# Patient Record
Sex: FEMALE | Race: WHITE
[De-identification: names, ages, dates, MRNs, and addresses within clinical notes are randomized per-mention and may not be internally consistent; named-entity substitution may affect disease eponyms.]

---

## 2017-12-01 ENCOUNTER — HOSPITAL ENCOUNTER (OUTPATIENT)
Dept: HOSPITAL 53 - M RAD | Age: 27
End: 2017-12-01
Attending: ADVANCED PRACTICE MIDWIFE
Payer: COMMERCIAL

## 2017-12-01 DIAGNOSIS — Z36.2: Primary | ICD-10-CM

## 2017-12-01 NOTE — REP
Clinical:  Anatomical evaluation.

 

Comparison: 11/08/2017 .

 

Findings:

Examination demonstrates a single live intrauterine pregnancy in breech

presentation.  Fetal motion is identified by technologist.  Placenta is noted

anteriorly and grade one without evidence for placenta previa or abruption.

Amniotic fluid volume is normal.  Cervix measures 4.0 cm in length and appears

closed.  No evidence for nuchal cord.

 

Gestational age by LMP 22 weeks 3 days with ZARINA 04/03/2018 .

Gestational age by current measurements 21 weeks 5 days with ZARINA 04/08/2018 .

 

FHR equals 162 beats per minute.

Estimated fetal weight 413 grams ( 13th percentile).

 

Anatomical assessment demonstrates normal structures including cranium, choroid

plexus, cavum, cerebellum/posterior fossa, facial features, lungs, four-chamber

heart/ventricular outflow tracts, diaphragm, stomach, cord insertion/three-vessel

cord, kidneys/bladder, spine, and extremities.

 

Impression:

Single live intrauterine pregnancy in breech presentation demonstrating

appropriate interval growth.  Anatomical assessment is complete and normal.  No

gross abnormalities are identified.

 

 

Signed by

Dilan Monge MD 12/01/2017 11:46 A

## 2018-01-08 ENCOUNTER — HOSPITAL ENCOUNTER (OUTPATIENT)
Dept: HOSPITAL 53 - M SMT | Age: 28
End: 2018-01-08
Attending: ADVANCED PRACTICE MIDWIFE
Payer: COMMERCIAL

## 2018-01-08 DIAGNOSIS — Z34.82: Primary | ICD-10-CM

## 2018-01-08 LAB
GLUCOSE CHALLENGE TEST 1 HOUR: 117 MG/DL (ref ?–140)
HEMATOCRIT: 36.9 % (ref 36–47)
HEMOGLOBIN: 12.5 G/DL (ref 12–16)
MEAN CORPUSCULAR HEMOGLOBIN: 30.6 PG (ref 27–33)
MEAN CORPUSCULAR HGB CONC: 33.9 G/DL (ref 32–36.5)
MEAN CORPUSCULAR VOLUME: 90.2 FL (ref 80–96)
NRBC BLD AUTO-RTO: 0 % (ref 0–0)
PLATELET COUNT, AUTOMATED: 215 10^3/UL (ref 150–450)
RED BLOOD COUNT: 4.09 10^6/UL (ref 4–5.4)
RED CELL DISTRIBUTION WIDTH: 13 % (ref 11.5–14.5)
WHITE BLOOD COUNT: 8.7 10^3/UL (ref 4–10)

## 2018-01-08 PROCEDURE — 82950 GLUCOSE TEST: CPT

## 2018-03-15 ENCOUNTER — HOSPITAL ENCOUNTER (INPATIENT)
Dept: HOSPITAL 53 - M LDO | Age: 28
LOS: 2 days | Discharge: HOME | DRG: 560 | End: 2018-03-17
Payer: COMMERCIAL

## 2018-03-15 DIAGNOSIS — Z3A.37: ICD-10-CM

## 2018-03-15 LAB
HEMATOCRIT: 39.7 % (ref 36–47)
HEMOGLOBIN: 13.7 G/DL (ref 12–16)
MEAN CORPUSCULAR HEMOGLOBIN: 30.1 PG (ref 27–33)
MEAN CORPUSCULAR HGB CONC: 34.5 G/DL (ref 32–36.5)
MEAN CORPUSCULAR VOLUME: 87.3 FL (ref 80–96)
NRBC BLD AUTO-RTO: 0 % (ref 0–0)
PLATELET COUNT, AUTOMATED: 248 10^3/UL (ref 150–450)
RED BLOOD COUNT: 4.55 10^6/UL (ref 4–5.4)
RED CELL DISTRIBUTION WIDTH: 13.3 % (ref 11.5–14.5)
WHITE BLOOD COUNT: 13.6 10^3/UL (ref 4–10)

## 2018-03-15 RX ADMIN — SODIUM CHLORIDE, POTASSIUM CHLORIDE, SODIUM LACTATE AND CALCIUM CHLORIDE 1 MLS/HR: 600; 310; 30; 20 INJECTION, SOLUTION INTRAVENOUS at 14:32

## 2018-03-15 RX ADMIN — Medication 1 MLS/HR: at 12:36

## 2018-03-15 RX ADMIN — DEXTROSE MONOHYDRATE 1 MLS/HR: 5 INJECTION INTRAVENOUS at 05:38

## 2018-03-15 RX ADMIN — Medication 1 MLS/HR: at 14:54

## 2018-03-15 RX ADMIN — Medication 1 MLS/HR: at 11:13

## 2018-03-15 RX ADMIN — SODIUM CHLORIDE, POTASSIUM CHLORIDE, SODIUM LACTATE AND CALCIUM CHLORIDE 1 MLS/HR: 600; 310; 30; 20 INJECTION, SOLUTION INTRAVENOUS at 12:35

## 2018-03-16 LAB — SYPHILIS: NONREACTIVE

## 2018-03-16 RX ADMIN — IBUPROFEN 1 MG: 800 TABLET, FILM COATED ORAL at 09:13

## 2018-03-16 RX ADMIN — Medication 1 TAB: at 09:08

## 2018-03-17 RX ADMIN — Medication 1 TAB: at 08:54

## 2018-03-17 RX ADMIN — IBUPROFEN 1 MG: 800 TABLET, FILM COATED ORAL at 08:55

## 2018-09-01 ENCOUNTER — HOSPITAL ENCOUNTER (OUTPATIENT)
Dept: HOSPITAL 53 - M WUC | Age: 28
End: 2018-09-01
Attending: PHYSICIAN ASSISTANT
Payer: COMMERCIAL

## 2018-09-01 DIAGNOSIS — M79.671: Primary | ICD-10-CM

## 2018-12-28 ENCOUNTER — HOSPITAL ENCOUNTER (OUTPATIENT)
Dept: HOSPITAL 53 - M LAB | Age: 28
End: 2018-12-28
Attending: ADVANCED PRACTICE MIDWIFE
Payer: COMMERCIAL

## 2018-12-28 DIAGNOSIS — Z36.89: ICD-10-CM

## 2018-12-28 DIAGNOSIS — Z34.81: Primary | ICD-10-CM

## 2018-12-28 LAB
BASOPHILS # BLD AUTO: 0 10^3/UL (ref 0–0.2)
BASOPHILS NFR BLD AUTO: 0.2 % (ref 0–1)
CHLAMYDIA DNA AMPLIFICATION: NEGATIVE
EOSINOPHIL # BLD AUTO: 0.1 10^3/UL (ref 0–0.5)
EOSINOPHIL NFR BLD AUTO: 1.6 % (ref 0–3)
HCT VFR BLD AUTO: 40.3 % (ref 36–47)
HGB BLD-MCNC: 14 G/DL (ref 12–15.5)
LYMPHOCYTES # BLD AUTO: 2.4 10^3/UL (ref 1.5–6.5)
LYMPHOCYTES NFR BLD AUTO: 28.3 % (ref 24–44)
MCH RBC QN AUTO: 31 PG (ref 27–33)
MCHC RBC AUTO-ENTMCNC: 34.7 G/DL (ref 32–36.5)
MCV RBC AUTO: 89.4 FL (ref 80–96)
MONOCYTES # BLD AUTO: 0.3 10^3/UL (ref 0–0.8)
MONOCYTES NFR BLD AUTO: 3.7 % (ref 0–5)
N GONORRHOEA RRNA SPEC QL NAA+PROBE: NEGATIVE
NEUTROPHILS # BLD AUTO: 5.7 10^3/UL (ref 1.8–7.7)
NEUTROPHILS NFR BLD AUTO: 65.9 % (ref 36–66)
PLATELET # BLD AUTO: 204 10^3/UL (ref 150–450)
RBC # BLD AUTO: 4.51 10^6/UL (ref 4–5.4)
WBC # BLD AUTO: 8.6 10^3/UL (ref 4–10)

## 2018-12-31 LAB
HCV AB SER QL: 0.1 INDEX (ref ?–0.8)
HIV 1+2 AB+HIV1 P24 AG SERPL QL IA: NEGATIVE
RUBELLA IGG QUALITATIVE: (no result)

## 2019-01-22 ENCOUNTER — HOSPITAL ENCOUNTER (OUTPATIENT)
Dept: HOSPITAL 53 - M RAD | Age: 29
End: 2019-01-22
Attending: ADVANCED PRACTICE MIDWIFE
Payer: COMMERCIAL

## 2019-01-22 DIAGNOSIS — Z36.89: ICD-10-CM

## 2019-01-22 DIAGNOSIS — Z3A.18: ICD-10-CM

## 2019-01-23 NOTE — REP
Clinical:  Anatomical evaluation.

 

Comparison: 12/18/2018 .

 

Findings:

Examination demonstrates a single live intrauterine pregnancy in breech

presentation.  Fetal motion is identified by technologist.  Placenta is noted

anterior/left lateral and grade 1 with evidence for vasa previa.  No placenta

previa or abruption.  Amniotic fluid volume is normal.  Cervix measures 5.2 cm in

length and appears closed.  No evidence for nuchal cord.

 

Gestational age by LMP 18 weeks 3 days with ZARINA 06/22/ 2019 .

Gestational age by current measurements 18 weeks 2 days with ZARINA 06/23/2019 .

 

FHR equals 130 beats per minute.

BPD  4.0 cm     18 weeks 1 day

HC  15.1 cm     18 weeks 1 day

AC  12.8 cm     18 weeks 3 days

FL  2.6 cm      17 weeks 6 days

HL  2.6 cm      18 weeks 2 days

HC/AC ratio  1.18

 

Estimated fetal weight 226 grams ( 33rd percentile).

 

Anatomical assessment demonstrates normal structures including cranium, choroid

plexus, cavum, cerebellum/posterior fossa, facial features, lungs, four-chamber

heart/ventricular outflow tracts, diaphragm, stomach, cord insertion/three-vessel

cord, kidneys/bladder, spine, and extremities.

 

Impression:

1.  Single live intrauterine pregnancy in breech presentation.  Appropriate

interval growth noted.

2.  Vasa previa noted.

3.  Limited evaluation of the facial profile.  Otherwise normal anatomical

assessment.

 

 

Electronically Signed by

Dilan Monge MD 01/23/2019 07:47 A

## 2019-03-28 ENCOUNTER — HOSPITAL ENCOUNTER (OUTPATIENT)
Dept: HOSPITAL 53 - M SMT | Age: 29
End: 2019-03-28
Attending: ADVANCED PRACTICE MIDWIFE
Payer: COMMERCIAL

## 2019-03-28 DIAGNOSIS — Z3A.00: ICD-10-CM

## 2019-03-28 DIAGNOSIS — O43.892: Primary | ICD-10-CM

## 2019-03-28 LAB
HCT VFR BLD AUTO: 37.8 % (ref 36–47)
HGB BLD-MCNC: 12.8 G/DL (ref 12–15.5)
MCH RBC QN AUTO: 30.2 PG (ref 27–33)
MCHC RBC AUTO-ENTMCNC: 33.9 G/DL (ref 32–36.5)
MCV RBC AUTO: 89.2 FL (ref 80–96)
PLATELET # BLD AUTO: 203 10^3/UL (ref 150–450)
RBC # BLD AUTO: 4.24 10^6/UL (ref 4–5.4)
WBC # BLD AUTO: 8.5 10^3/UL (ref 4–10)

## 2019-04-04 ENCOUNTER — HOSPITAL ENCOUNTER (OUTPATIENT)
Dept: HOSPITAL 53 - M LAB | Age: 29
End: 2019-04-04
Attending: OBSTETRICS & GYNECOLOGY
Payer: COMMERCIAL

## 2019-04-04 DIAGNOSIS — Z34.82: Primary | ICD-10-CM

## 2019-04-04 DIAGNOSIS — Z3A.00: ICD-10-CM

## 2019-06-06 ENCOUNTER — HOSPITAL ENCOUNTER (OUTPATIENT)
Dept: HOSPITAL 53 - M LAB | Age: 29
End: 2019-06-06
Attending: ADVANCED PRACTICE MIDWIFE
Payer: COMMERCIAL

## 2019-06-06 DIAGNOSIS — O13.3: Primary | ICD-10-CM

## 2019-06-06 DIAGNOSIS — Z3A.00: ICD-10-CM

## 2019-06-06 LAB
ALT SERPL W P-5'-P-CCNC: 50 U/L (ref 12–78)
BILIRUB SERPL-MCNC: 0.3 MG/DL (ref 0.2–1)
CREAT SERPL-MCNC: 0.67 MG/DL (ref 0.55–1.3)
CREAT UR-MCNC: 62.8 MG/DL
GFR SERPL CREATININE-BSD FRML MDRD: > 60 ML/MIN/{1.73_M2} (ref 60–?)
HCT VFR BLD AUTO: 40.2 % (ref 36–47)
HGB BLD-MCNC: 13.8 G/DL (ref 12–15.5)
LDH SERPL L TO P-CCNC: 166 U/L (ref 84–246)
MCH RBC QN AUTO: 30.3 PG (ref 27–33)
MCHC RBC AUTO-ENTMCNC: 34.3 G/DL (ref 32–36.5)
MCV RBC AUTO: 88.2 FL (ref 80–96)
PLATELET # BLD AUTO: 210 10^3/UL (ref 150–450)
PROT UR-MCNC: 8.8 MG/DL (ref 0–12)
RBC # BLD AUTO: 4.56 10^6/UL (ref 4–5.4)
URATE SERPL-MCNC: 4.6 MG/DL (ref 2.6–6)
WBC # BLD AUTO: 9.3 10^3/UL (ref 4–10)

## 2019-06-08 ENCOUNTER — HOSPITAL ENCOUNTER (INPATIENT)
Dept: HOSPITAL 53 - M LDI | Age: 29
LOS: 2 days | Discharge: HOME | DRG: 560 | End: 2019-06-10
Attending: ADVANCED PRACTICE MIDWIFE | Admitting: ADVANCED PRACTICE MIDWIFE
Payer: COMMERCIAL

## 2019-06-08 VITALS — DIASTOLIC BLOOD PRESSURE: 65 MMHG | SYSTOLIC BLOOD PRESSURE: 118 MMHG

## 2019-06-08 VITALS — DIASTOLIC BLOOD PRESSURE: 73 MMHG | SYSTOLIC BLOOD PRESSURE: 126 MMHG

## 2019-06-08 VITALS — HEIGHT: 68 IN | BODY MASS INDEX: 34.88 KG/M2 | WEIGHT: 230.16 LBS

## 2019-06-08 VITALS — DIASTOLIC BLOOD PRESSURE: 71 MMHG | SYSTOLIC BLOOD PRESSURE: 122 MMHG

## 2019-06-08 VITALS — SYSTOLIC BLOOD PRESSURE: 120 MMHG | DIASTOLIC BLOOD PRESSURE: 71 MMHG

## 2019-06-08 VITALS — DIASTOLIC BLOOD PRESSURE: 84 MMHG | SYSTOLIC BLOOD PRESSURE: 121 MMHG

## 2019-06-08 VITALS — DIASTOLIC BLOOD PRESSURE: 60 MMHG | SYSTOLIC BLOOD PRESSURE: 131 MMHG

## 2019-06-08 VITALS — DIASTOLIC BLOOD PRESSURE: 73 MMHG | SYSTOLIC BLOOD PRESSURE: 120 MMHG

## 2019-06-08 VITALS — DIASTOLIC BLOOD PRESSURE: 60 MMHG | SYSTOLIC BLOOD PRESSURE: 126 MMHG

## 2019-06-08 VITALS — SYSTOLIC BLOOD PRESSURE: 129 MMHG | DIASTOLIC BLOOD PRESSURE: 60 MMHG

## 2019-06-08 VITALS — DIASTOLIC BLOOD PRESSURE: 68 MMHG | SYSTOLIC BLOOD PRESSURE: 118 MMHG

## 2019-06-08 VITALS — DIASTOLIC BLOOD PRESSURE: 63 MMHG | SYSTOLIC BLOOD PRESSURE: 126 MMHG

## 2019-06-08 VITALS — DIASTOLIC BLOOD PRESSURE: 71 MMHG | SYSTOLIC BLOOD PRESSURE: 123 MMHG

## 2019-06-08 VITALS — SYSTOLIC BLOOD PRESSURE: 116 MMHG | DIASTOLIC BLOOD PRESSURE: 65 MMHG

## 2019-06-08 VITALS — DIASTOLIC BLOOD PRESSURE: 64 MMHG | SYSTOLIC BLOOD PRESSURE: 121 MMHG

## 2019-06-08 VITALS — DIASTOLIC BLOOD PRESSURE: 67 MMHG | SYSTOLIC BLOOD PRESSURE: 126 MMHG

## 2019-06-08 VITALS — DIASTOLIC BLOOD PRESSURE: 65 MMHG | SYSTOLIC BLOOD PRESSURE: 128 MMHG

## 2019-06-08 VITALS — SYSTOLIC BLOOD PRESSURE: 126 MMHG | DIASTOLIC BLOOD PRESSURE: 66 MMHG

## 2019-06-08 VITALS — SYSTOLIC BLOOD PRESSURE: 122 MMHG | DIASTOLIC BLOOD PRESSURE: 70 MMHG

## 2019-06-08 DIAGNOSIS — Z3A.39: ICD-10-CM

## 2019-06-08 LAB
ALT SERPL W P-5'-P-CCNC: 42 U/L (ref 12–78)
BILIRUB SERPL-MCNC: 0.2 MG/DL (ref 0.2–1)
CREAT SERPL-MCNC: 0.69 MG/DL (ref 0.55–1.3)
CREAT UR-MCNC: 115 MG/DL
GFR SERPL CREATININE-BSD FRML MDRD: > 60 ML/MIN/{1.73_M2} (ref 60–?)
HCT VFR BLD AUTO: 38.8 % (ref 36–47)
HGB BLD-MCNC: 13.4 G/DL (ref 12–15.5)
LDH SERPL L TO P-CCNC: 148 U/L (ref 84–246)
MCH RBC QN AUTO: 30.9 PG (ref 27–33)
MCHC RBC AUTO-ENTMCNC: 34.5 G/DL (ref 32–36.5)
MCV RBC AUTO: 89.6 FL (ref 80–96)
PLATELET # BLD AUTO: 175 10^3/UL (ref 150–450)
PROT UR-MCNC: 39.4 MG/DL (ref 0–12)
RBC # BLD AUTO: 4.33 10^6/UL (ref 4–5.4)
URATE SERPL-MCNC: 4.6 MG/DL (ref 2.6–6)
WBC # BLD AUTO: 7.7 10^3/UL (ref 4–10)

## 2019-06-08 PROCEDURE — 3E0DXGC INTRODUCTION OF OTHER THERAPEUTIC SUBSTANCE INTO MOUTH AND PHARYNX, EXTERNAL APPROACH: ICD-10-PCS | Performed by: OBSTETRICS & GYNECOLOGY

## 2019-06-08 RX ADMIN — SODIUM CHLORIDE, POTASSIUM CHLORIDE, SODIUM LACTATE AND CALCIUM CHLORIDE SCH MLS/HR: 600; 310; 30; 20 INJECTION, SOLUTION INTRAVENOUS at 17:40

## 2019-06-08 RX ADMIN — SODIUM CHLORIDE, POTASSIUM CHLORIDE, SODIUM LACTATE AND CALCIUM CHLORIDE SCH MLS/HR: 600; 310; 30; 20 INJECTION, SOLUTION INTRAVENOUS at 22:25

## 2019-06-08 RX ADMIN — MISOPROSTOL SCH MCG: 100 TABLET ORAL at 08:15

## 2019-06-08 RX ADMIN — MISOPROSTOL SCH MCG: 100 TABLET ORAL at 12:48

## 2019-06-08 NOTE — HPE
DATE OF ADMISSION:  2019

 

Ximena is a 28-year-old,  2, para 1-0-0-1 at 39 weeks' gestation, estimated

date of confinement (EDC) of 2019 based on second-trimester ultrasound.

She presents to labor and delivery today for induction of labor due to recent

diagnosis of gestational hypertension.  She denies any regular contractions,

vaginal bleeding, and leakage of fluid.  The fetus has been active.  Her 
prenatal

care was initiated at [facility] in the second trimester.  Her

 course complicated by gestational hypertension, question of vasa 
previa

on anatomy scan was seen at [facility] for formal

ultrasound with the report of no vasa previa.

 

OBSTETRIC HISTORY:

2018, 37-2/7 weeks, 6 pound 5 ounce female, spontaneous vaginal delivery.

 

OBSTETRIC LABORATORIES:  O+, antibody screen negative, rubella immune, [facility]ene[facility]

[facility] ([facility]) nonreactive.  Urine culture no growth.

Hepatitis B surface antigen negative, HIV negative, hepatitis C antibody

nonreactive, gonorrhea and chlamydia negative.  She declined genetic screening

laboratories.  Gestational diabetic screening abnormal at 140.  3-hour glucose

tolerance test normal.  Fasting 80.  1-hour 155.  2-hour 129.  3-hour 93.  Group

B streptococcus (GBS) is negative.

 

PAST MEDICAL HISTORY:  Childhood varicella.

 

SURGERIES:  None.

 

FAMILY HISTORY:  Noncontributory.

 

SOCIAL HISTORY:  The patient is single.  However, her partner is at bedside and

supportive.  She is a nonsmoker.  Denies alcohol and drug use.  No history of 
any

sexually-transmitted infections and denies history of abuse:  physical, sexual,

and emotional.

 

ALLERGIES:  No known drug allergies.

 

CURRENT MEDICATIONS:

-  prenatal vitamin

 

OBJECTIVE:

Temperature 98.2, pulse 86, respirations 18, blood pressure (BP) is 126/60.

Currently, the fetal heart rate is 160 with moderate variability and positive

accelerations, no decelerations.

There is no pattern of contractions.

Her abdomen is gravid, cephalic presentation.  Estimated fetal weight 7 pounds.

 

Sterile vaginal examination:  1 cm dilated, 75% effaced, -2 station.

 

ASSESSMENT:  Intrauterine pregnancy at 39 weeks.  Fetal heart rate category one.

Gestational hypertension.

 

PLAN:  Admit the patient to labor and delivery.  Out of bed ad abhinav.  Routine

laboratories with the addition of preeclamptic profile and spot urine.  A saline

lock.  Start misoprostol 50 mcg by mouth every 4 hours for cervical ripening.

The patient does desire an epidural when she is in active labor.  I did review

risks, benefits, and alternatives.  The patient and her partner have had all

their questions answered and desire to proceed with induction.  She has been

verbally consented for emergency surgery and blood products if necessary.  I do

anticipate cervical ripening, labor, and a spontaneous vaginal delivery.

MTDD

## 2019-06-09 VITALS — DIASTOLIC BLOOD PRESSURE: 59 MMHG | SYSTOLIC BLOOD PRESSURE: 114 MMHG

## 2019-06-09 VITALS — DIASTOLIC BLOOD PRESSURE: 59 MMHG | SYSTOLIC BLOOD PRESSURE: 105 MMHG

## 2019-06-09 VITALS — SYSTOLIC BLOOD PRESSURE: 108 MMHG | DIASTOLIC BLOOD PRESSURE: 59 MMHG

## 2019-06-09 VITALS — DIASTOLIC BLOOD PRESSURE: 64 MMHG | SYSTOLIC BLOOD PRESSURE: 119 MMHG

## 2019-06-09 VITALS — DIASTOLIC BLOOD PRESSURE: 76 MMHG | SYSTOLIC BLOOD PRESSURE: 133 MMHG

## 2019-06-09 VITALS — DIASTOLIC BLOOD PRESSURE: 64 MMHG | SYSTOLIC BLOOD PRESSURE: 104 MMHG

## 2019-06-09 VITALS — SYSTOLIC BLOOD PRESSURE: 99 MMHG | DIASTOLIC BLOOD PRESSURE: 55 MMHG

## 2019-06-09 VITALS — SYSTOLIC BLOOD PRESSURE: 106 MMHG | DIASTOLIC BLOOD PRESSURE: 63 MMHG

## 2019-06-09 VITALS — SYSTOLIC BLOOD PRESSURE: 125 MMHG | DIASTOLIC BLOOD PRESSURE: 67 MMHG

## 2019-06-09 VITALS — SYSTOLIC BLOOD PRESSURE: 113 MMHG | DIASTOLIC BLOOD PRESSURE: 59 MMHG

## 2019-06-09 VITALS — DIASTOLIC BLOOD PRESSURE: 77 MMHG | SYSTOLIC BLOOD PRESSURE: 123 MMHG

## 2019-06-09 VITALS — DIASTOLIC BLOOD PRESSURE: 54 MMHG | SYSTOLIC BLOOD PRESSURE: 110 MMHG

## 2019-06-09 VITALS — SYSTOLIC BLOOD PRESSURE: 118 MMHG | DIASTOLIC BLOOD PRESSURE: 60 MMHG

## 2019-06-09 VITALS — SYSTOLIC BLOOD PRESSURE: 110 MMHG | DIASTOLIC BLOOD PRESSURE: 53 MMHG

## 2019-06-09 VITALS — SYSTOLIC BLOOD PRESSURE: 112 MMHG | DIASTOLIC BLOOD PRESSURE: 62 MMHG

## 2019-06-09 VITALS — SYSTOLIC BLOOD PRESSURE: 116 MMHG | DIASTOLIC BLOOD PRESSURE: 57 MMHG

## 2019-06-09 VITALS — DIASTOLIC BLOOD PRESSURE: 58 MMHG | SYSTOLIC BLOOD PRESSURE: 107 MMHG

## 2019-06-09 VITALS — SYSTOLIC BLOOD PRESSURE: 129 MMHG | DIASTOLIC BLOOD PRESSURE: 80 MMHG

## 2019-06-09 VITALS — SYSTOLIC BLOOD PRESSURE: 108 MMHG | DIASTOLIC BLOOD PRESSURE: 58 MMHG

## 2019-06-09 VITALS — SYSTOLIC BLOOD PRESSURE: 114 MMHG | DIASTOLIC BLOOD PRESSURE: 60 MMHG

## 2019-06-09 VITALS — DIASTOLIC BLOOD PRESSURE: 56 MMHG | SYSTOLIC BLOOD PRESSURE: 114 MMHG

## 2019-06-09 VITALS — DIASTOLIC BLOOD PRESSURE: 59 MMHG | SYSTOLIC BLOOD PRESSURE: 124 MMHG

## 2019-06-09 VITALS — DIASTOLIC BLOOD PRESSURE: 57 MMHG | SYSTOLIC BLOOD PRESSURE: 105 MMHG

## 2019-06-09 VITALS — DIASTOLIC BLOOD PRESSURE: 56 MMHG | SYSTOLIC BLOOD PRESSURE: 106 MMHG

## 2019-06-09 VITALS — SYSTOLIC BLOOD PRESSURE: 114 MMHG | DIASTOLIC BLOOD PRESSURE: 59 MMHG

## 2019-06-09 VITALS — DIASTOLIC BLOOD PRESSURE: 66 MMHG | SYSTOLIC BLOOD PRESSURE: 121 MMHG

## 2019-06-09 VITALS — SYSTOLIC BLOOD PRESSURE: 129 MMHG | DIASTOLIC BLOOD PRESSURE: 66 MMHG

## 2019-06-09 VITALS — SYSTOLIC BLOOD PRESSURE: 111 MMHG | DIASTOLIC BLOOD PRESSURE: 58 MMHG

## 2019-06-09 VITALS — SYSTOLIC BLOOD PRESSURE: 126 MMHG | DIASTOLIC BLOOD PRESSURE: 59 MMHG

## 2019-06-09 VITALS — SYSTOLIC BLOOD PRESSURE: 105 MMHG | DIASTOLIC BLOOD PRESSURE: 55 MMHG

## 2019-06-09 VITALS — SYSTOLIC BLOOD PRESSURE: 116 MMHG | DIASTOLIC BLOOD PRESSURE: 60 MMHG

## 2019-06-09 VITALS — DIASTOLIC BLOOD PRESSURE: 63 MMHG | SYSTOLIC BLOOD PRESSURE: 116 MMHG

## 2019-06-09 VITALS — SYSTOLIC BLOOD PRESSURE: 109 MMHG | DIASTOLIC BLOOD PRESSURE: 57 MMHG

## 2019-06-09 VITALS — SYSTOLIC BLOOD PRESSURE: 120 MMHG | DIASTOLIC BLOOD PRESSURE: 65 MMHG

## 2019-06-09 VITALS — SYSTOLIC BLOOD PRESSURE: 114 MMHG | DIASTOLIC BLOOD PRESSURE: 62 MMHG

## 2019-06-09 VITALS — SYSTOLIC BLOOD PRESSURE: 116 MMHG | DIASTOLIC BLOOD PRESSURE: 55 MMHG

## 2019-06-09 VITALS — DIASTOLIC BLOOD PRESSURE: 50 MMHG | SYSTOLIC BLOOD PRESSURE: 101 MMHG

## 2019-06-09 VITALS — DIASTOLIC BLOOD PRESSURE: 65 MMHG | SYSTOLIC BLOOD PRESSURE: 128 MMHG

## 2019-06-09 VITALS — SYSTOLIC BLOOD PRESSURE: 112 MMHG | DIASTOLIC BLOOD PRESSURE: 59 MMHG

## 2019-06-09 VITALS — DIASTOLIC BLOOD PRESSURE: 55 MMHG | SYSTOLIC BLOOD PRESSURE: 105 MMHG

## 2019-06-09 VITALS — DIASTOLIC BLOOD PRESSURE: 56 MMHG | SYSTOLIC BLOOD PRESSURE: 113 MMHG

## 2019-06-09 VITALS — SYSTOLIC BLOOD PRESSURE: 108 MMHG | DIASTOLIC BLOOD PRESSURE: 54 MMHG

## 2019-06-09 VITALS — SYSTOLIC BLOOD PRESSURE: 123 MMHG | DIASTOLIC BLOOD PRESSURE: 74 MMHG

## 2019-06-09 VITALS — SYSTOLIC BLOOD PRESSURE: 106 MMHG | DIASTOLIC BLOOD PRESSURE: 58 MMHG

## 2019-06-09 VITALS — SYSTOLIC BLOOD PRESSURE: 113 MMHG | DIASTOLIC BLOOD PRESSURE: 57 MMHG

## 2019-06-09 VITALS — SYSTOLIC BLOOD PRESSURE: 96 MMHG | DIASTOLIC BLOOD PRESSURE: 53 MMHG

## 2019-06-09 VITALS — DIASTOLIC BLOOD PRESSURE: 73 MMHG | SYSTOLIC BLOOD PRESSURE: 133 MMHG

## 2019-06-09 LAB
HCT VFR BLD AUTO: 38.9 % (ref 36–47)
HGB BLD-MCNC: 13.4 G/DL (ref 12–15.5)
MCH RBC QN AUTO: 30.9 PG (ref 27–33)
MCHC RBC AUTO-ENTMCNC: 34.4 G/DL (ref 32–36.5)
MCV RBC AUTO: 89.6 FL (ref 80–96)
PLATELET # BLD AUTO: 175 10^3/UL (ref 150–450)
RBC # BLD AUTO: 4.34 10^6/UL (ref 4–5.4)
WBC # BLD AUTO: 9.6 10^3/UL (ref 4–10)

## 2019-06-09 RX ADMIN — Medication SCH TAB: at 14:59

## 2019-06-09 RX ADMIN — Medication PRN MG: at 10:13

## 2019-06-09 RX ADMIN — Medication PRN MG: at 12:51

## 2019-06-09 RX ADMIN — SODIUM CHLORIDE, POTASSIUM CHLORIDE, SODIUM LACTATE AND CALCIUM CHLORIDE SCH MLS/HR: 600; 310; 30; 20 INJECTION, SOLUTION INTRAVENOUS at 08:56

## 2019-06-09 RX ADMIN — SODIUM CHLORIDE, POTASSIUM CHLORIDE, SODIUM LACTATE AND CALCIUM CHLORIDE SCH MLS/HR: 600; 310; 30; 20 INJECTION, SOLUTION INTRAVENOUS at 06:04

## 2019-06-09 RX ADMIN — Medication PRN MG: at 10:47

## 2019-06-09 NOTE — NUR
L&D Note:

S: comfortable

O: vss, AF

cat 1 fetal tracing

gen: well appearing

cx: 4/75/-2, cooks out. AROM clear

A/P: 27yo  IOL GHTN

-cont pitocin 

- good candidate for epidural

-re check in within 4 hrs

-anticipate 

Lisa Dasilva MD

## 2019-06-09 NOTE — NUR
L&D Note:

S: comfortable

O: vss, AF

cat 1 fetal tracing

gen: well appearing

cx: 1/75/-2, cooks cath placed 60/30

A/P: 27yo  IOL GHTN

-cont pitocin 

- good candidate for epidural

-anticipate 

Lisa Dasilva MD

## 2019-06-10 VITALS — DIASTOLIC BLOOD PRESSURE: 60 MMHG | SYSTOLIC BLOOD PRESSURE: 119 MMHG

## 2019-06-10 RX ADMIN — Medication SCH TAB: at 08:25

## 2019-06-10 NOTE — NUR
PPD# 1 

S: Doing well w/o complaints. +voids, +ambulation and pain well controlled

O:  vss, AF

gen: well appearing

abd: soft, nttp ff@U

ext: neg calf tenderness

A/P: PPD # 1 s/p NSD, recovering in stable condition

-continue routine care

-d/c plans for tomorrow or later today

Lisa Dasilva MD

## 2019-06-10 NOTE — DN
DATE OF DELIVERY:  2019

TIME OF BIRTH:  1413

GENDER:  Female.

APGARS:  8 and 9.

WEIGHT:  6 pounds 9 ounces or 2970 grams.

ANESTHESIA:  Epidural.

LACERATIONS:  None.

ESTIMATED BLOOD LOSS:  300 mL.

COUNTS:  5 laparotomy sponges accounted for prior to delivery.

 

DELIVERY NOTE:  On 2019, at 1413, Ms. Haas, a 28-year-old,  2, now

para 2, had spontaneous vaginal delivery of a live born female infant, Apgars 8

and 9, weight was 6 pounds 9 ounces or 2970 grams.  Head was delivered occiput

anterior (OA) over an intact perineum followed by delivery of shoulders and

corpus.  The infant was handed to mom with a good cry.  Cord was clamped times

two and was cut by the father of the baby under my direction.  Placenta was then

drained and delivered grossly intact.  A premixed bag of 500 mL of normal saline

with 30 units of Pitocin was then bolused along with uterine massage until the

uterus was firm.  On inspection, cervix, vagina and perineum was grossly intact

and hemostatic.  Mom and baby recovering in stable condition.  The couple has

decided to name their  daughter, Aydee.

## 2019-10-29 ENCOUNTER — HOSPITAL ENCOUNTER (OUTPATIENT)
Dept: HOSPITAL 53 - M LAB REF | Age: 29
End: 2019-10-29
Attending: OBSTETRICS & GYNECOLOGY
Payer: COMMERCIAL

## 2019-10-29 DIAGNOSIS — R87.619: ICD-10-CM

## 2019-10-29 DIAGNOSIS — Z12.4: Primary | ICD-10-CM

## 2021-02-25 ENCOUNTER — HOSPITAL ENCOUNTER (OUTPATIENT)
Dept: HOSPITAL 53 - M SFHCWAGY | Age: 31
End: 2021-02-25
Attending: ADVANCED PRACTICE MIDWIFE
Payer: COMMERCIAL

## 2021-02-25 DIAGNOSIS — Z77.9: ICD-10-CM

## 2021-02-25 DIAGNOSIS — Z12.4: Primary | ICD-10-CM

## 2021-02-25 PROCEDURE — 87624 HPV HI-RISK TYP POOLED RSLT: CPT

## 2023-03-14 ENCOUNTER — HOSPITAL ENCOUNTER (OUTPATIENT)
Dept: HOSPITAL 53 - M PLALAB | Age: 33
End: 2023-03-14
Attending: OBSTETRICS & GYNECOLOGY
Payer: COMMERCIAL

## 2023-03-14 DIAGNOSIS — Z34.81: Primary | ICD-10-CM

## 2023-03-14 DIAGNOSIS — Z3A.00: ICD-10-CM

## 2023-03-14 LAB
HCT VFR BLD AUTO: 40.2 % (ref 36–47)
HCV AB SER QL: 0 INDEX (ref ?–0.8)
HGB BLD-MCNC: 13.5 G/DL (ref 12–15.5)
HIV 1+2 AB+HIV1 P24 AG SERPL QL IA: NEGATIVE
MCH RBC QN AUTO: 30.2 PG (ref 27–33)
MCHC RBC AUTO-ENTMCNC: 33.6 G/DL (ref 32–36.5)
MCV RBC AUTO: 89.9 FL (ref 80–96)
PLATELET # BLD AUTO: 215 10^3/UL (ref 150–450)
RBC # BLD AUTO: 4.47 10^6/UL (ref 4–5.4)
WBC # BLD AUTO: 7.6 10^3/UL (ref 4–10)

## 2023-03-15 LAB — N GONORRHOEA RRNA SPEC QL NAA+PROBE: NEGATIVE

## 2023-05-19 ENCOUNTER — HOSPITAL ENCOUNTER (OUTPATIENT)
Dept: HOSPITAL 53 - M WHC | Age: 33
End: 2023-05-19
Attending: ADVANCED PRACTICE MIDWIFE
Payer: COMMERCIAL

## 2023-05-19 DIAGNOSIS — Z36.3: Primary | ICD-10-CM

## 2023-05-19 DIAGNOSIS — Z3A.19: ICD-10-CM

## 2023-07-11 ENCOUNTER — HOSPITAL ENCOUNTER (OUTPATIENT)
Dept: HOSPITAL 53 - M PLALAB | Age: 33
End: 2023-07-11
Attending: ADVANCED PRACTICE MIDWIFE
Payer: COMMERCIAL

## 2023-07-11 DIAGNOSIS — Z34.82: Primary | ICD-10-CM

## 2023-07-11 LAB
HCT VFR BLD AUTO: 37.3 % (ref 36–47)
HGB BLD-MCNC: 12.3 G/DL (ref 12–15.5)
MCH RBC QN AUTO: 30.1 PG (ref 27–33)
MCHC RBC AUTO-ENTMCNC: 33 G/DL (ref 32–36.5)
MCV RBC AUTO: 91.4 FL (ref 80–96)
PLATELET # BLD AUTO: 211 10^3/UL (ref 150–450)
RBC # BLD AUTO: 4.08 10^6/UL (ref 4–5.4)
WBC # BLD AUTO: 8.7 10^3/UL (ref 4–10)

## 2023-09-07 ENCOUNTER — HOSPITAL ENCOUNTER (OUTPATIENT)
Dept: HOSPITAL 53 - M PLALAB | Age: 33
End: 2023-09-07
Attending: ADVANCED PRACTICE MIDWIFE
Payer: COMMERCIAL

## 2023-09-07 DIAGNOSIS — Z34.80: Primary | ICD-10-CM

## 2023-09-15 ENCOUNTER — HOSPITAL ENCOUNTER (OUTPATIENT)
Dept: HOSPITAL 53 - M PLALAB | Age: 33
End: 2023-09-15
Attending: ADVANCED PRACTICE MIDWIFE
Payer: COMMERCIAL

## 2023-09-15 DIAGNOSIS — O13.9: Primary | ICD-10-CM

## 2023-09-15 LAB
ALT SERPL W P-5'-P-CCNC: 26 U/L (ref 7–40)
AST SERPL-CCNC: 13 U/L (ref ?–34)
BILIRUB SERPL-MCNC: 0.4 MG/DL (ref 0.3–1.2)
CREAT SERPL-MCNC: 0.57 MG/DL (ref 0.55–1.3)
CREAT UR-MCNC: 104.6 MG/DL
GFR SERPL CREATININE-BSD FRML MDRD: > 60 ML/MIN/{1.73_M2} (ref 60–?)
HCT VFR BLD AUTO: 38.8 % (ref 36–47)
HGB BLD-MCNC: 12.7 G/DL (ref 12–15.5)
LDH SERPL L TO P-CCNC: 155 U/L (ref 120–246)
MCH RBC QN AUTO: 29.3 PG (ref 27–33)
MCHC RBC AUTO-ENTMCNC: 32.7 G/DL (ref 32–36.5)
MCV RBC AUTO: 89.6 FL (ref 80–96)
PLATELET # BLD AUTO: 197 10^3/UL (ref 150–450)
PROT UR-MCNC: 21.1 MG/DL (ref 0–14)
RBC # BLD AUTO: 4.33 10^6/UL (ref 4–5.4)
WBC # BLD AUTO: 8.1 10^3/UL (ref 4–10)

## 2023-09-16 LAB — URATE SERPL-MCNC: 5 MG/DL (ref 3.1–7.8)

## 2025-01-15 ENCOUNTER — HOSPITAL ENCOUNTER (OUTPATIENT)
Dept: HOSPITAL 53 - M PLALAB | Age: 35
End: 2025-01-15
Attending: OBSTETRICS & GYNECOLOGY
Payer: COMMERCIAL

## 2025-01-15 DIAGNOSIS — Z34.81: Primary | ICD-10-CM

## 2025-01-15 LAB
HCT VFR BLD AUTO: 41.3 % (ref 36–47)
HCV AB SER QL: < 0.02 INDEX (ref ?–0.8)
HGB BLD-MCNC: 14 G/DL (ref 12–15.5)
HIV 1+2 AB+HIV1 P24 AG SERPL QL IA: NEGATIVE
MCH RBC QN AUTO: 30.8 PG (ref 27–33)
MCHC RBC AUTO-ENTMCNC: 33.9 G/DL (ref 32–36.5)
MCV RBC AUTO: 91 FL (ref 80–96)
N GONORRHOEA RRNA SPEC QL NAA+PROBE: NEGATIVE
PLATELET # BLD AUTO: 212 10^3/UL (ref 150–450)
RBC # BLD AUTO: 4.54 10^6/UL (ref 4–5.4)
WBC # BLD AUTO: 8 10^3/UL (ref 4–10)

## 2025-03-14 ENCOUNTER — HOSPITAL ENCOUNTER (OUTPATIENT)
Dept: HOSPITAL 53 - M PLALAB | Age: 35
End: 2025-03-14
Attending: GENERAL PRACTICE
Payer: COMMERCIAL

## 2025-03-14 DIAGNOSIS — Z34.82: Primary | ICD-10-CM

## 2025-03-14 DIAGNOSIS — Z53.20: Primary | ICD-10-CM

## 2025-03-14 LAB
ALT SERPL W P-5'-P-CCNC: 19 U/L (ref 7–40)
AST SERPL-CCNC: 14 U/L (ref ?–34)
BILIRUB SERPL-MCNC: 0.4 MG/DL (ref 0.3–1.2)
CREAT SERPL-MCNC: 0.59 MG/DL (ref 0.55–1.3)
CREAT UR-MCNC: 51.4 MG/DL
GFR SERPL CREATININE-BSD FRML MDRD: > 60 ML/MIN/{1.73_M2} (ref 60–?)
HCT VFR BLD AUTO: 40.6 % (ref 36–47)
HGB BLD-MCNC: 13.7 G/DL (ref 12–15.5)
LDH SERPL L TO P-CCNC: 161 U/L (ref 120–246)
MCH RBC QN AUTO: 30.4 PG (ref 27–33)
MCHC RBC AUTO-ENTMCNC: 33.7 G/DL (ref 32–36.5)
MCV RBC AUTO: 90 FL (ref 80–96)
PLATELET # BLD AUTO: 212 10^3/UL (ref 150–450)
PROT UR-MCNC: < 6 MG/DL (ref 0–14)
RBC # BLD AUTO: 4.51 10^6/UL (ref 4–5.4)
URATE SERPL-MCNC: 4.9 MG/DL (ref 3.1–7.8)
WBC # BLD AUTO: 7.9 10^3/UL (ref 4–10)

## 2025-03-21 ENCOUNTER — HOSPITAL ENCOUNTER (OUTPATIENT)
Dept: HOSPITAL 53 - M WHC | Age: 35
End: 2025-03-21
Attending: OBSTETRICS & GYNECOLOGY
Payer: COMMERCIAL

## 2025-03-21 DIAGNOSIS — Z34.82: Primary | ICD-10-CM

## 2025-04-17 ENCOUNTER — HOSPITAL ENCOUNTER (OUTPATIENT)
Dept: HOSPITAL 53 - M PLALAB | Age: 35
End: 2025-04-17
Attending: ADVANCED PRACTICE MIDWIFE
Payer: COMMERCIAL

## 2025-04-17 DIAGNOSIS — B34.3: Primary | ICD-10-CM

## 2025-05-09 ENCOUNTER — HOSPITAL ENCOUNTER (OUTPATIENT)
Dept: HOSPITAL 53 - M WHC | Age: 35
End: 2025-05-09
Attending: ADVANCED PRACTICE MIDWIFE
Payer: COMMERCIAL

## 2025-05-09 ENCOUNTER — HOSPITAL ENCOUNTER (OUTPATIENT)
Dept: HOSPITAL 53 - M PLALAB | Age: 35
End: 2025-05-09
Attending: GENERAL PRACTICE
Payer: COMMERCIAL

## 2025-05-09 DIAGNOSIS — Z34.83: Primary | ICD-10-CM

## 2025-05-09 DIAGNOSIS — Z3A.26: ICD-10-CM

## 2025-05-09 DIAGNOSIS — Z34.82: Primary | ICD-10-CM

## 2025-05-09 LAB
GLUCOSE 1H P 50 G GLC PO SERPL-MCNC: 119 MG/DL (ref ?–140)
HCT VFR BLD AUTO: 39.5 % (ref 36–47)
HCV AB SER QL: 0.04 INDEX (ref ?–0.8)
HGB BLD-MCNC: 13.1 G/DL (ref 12–15.5)
HIV 1+2 AB+HIV1 P24 AG SERPL QL IA: NEGATIVE
MCH RBC QN AUTO: 30.4 PG (ref 27–33)
MCHC RBC AUTO-ENTMCNC: 33.2 G/DL (ref 32–36.5)
MCV RBC AUTO: 91.6 FL (ref 80–96)
PLATELET # BLD AUTO: 218 10^3/UL (ref 150–450)
RBC # BLD AUTO: 4.31 10^6/UL (ref 4–5.4)
T VAGINALIS RRNA SPEC QL NAA+PROBE: NOT DETECTED
WBC # BLD AUTO: 8.2 10^3/UL (ref 4–10)

## 2025-05-12 LAB — N GONORRHOEA RRNA SPEC QL NAA+PROBE: NEGATIVE

## 2025-07-15 ENCOUNTER — HOSPITAL ENCOUNTER (OUTPATIENT)
Dept: HOSPITAL 53 - M SFHCWAGY | Age: 35
End: 2025-07-15
Attending: ADVANCED PRACTICE MIDWIFE
Payer: COMMERCIAL

## 2025-07-15 DIAGNOSIS — Z34.83: Primary | ICD-10-CM

## 2025-07-23 ENCOUNTER — HOSPITAL ENCOUNTER (INPATIENT)
Dept: HOSPITAL 53 - M LDI | Age: 35
LOS: 2 days | Discharge: HOME | DRG: 560 | End: 2025-07-25
Attending: ADVANCED PRACTICE MIDWIFE | Admitting: ADVANCED PRACTICE MIDWIFE
Payer: COMMERCIAL

## 2025-07-23 VITALS — SYSTOLIC BLOOD PRESSURE: 130 MMHG | DIASTOLIC BLOOD PRESSURE: 57 MMHG

## 2025-07-23 VITALS — SYSTOLIC BLOOD PRESSURE: 130 MMHG | DIASTOLIC BLOOD PRESSURE: 62 MMHG

## 2025-07-23 VITALS — SYSTOLIC BLOOD PRESSURE: 134 MMHG | DIASTOLIC BLOOD PRESSURE: 63 MMHG

## 2025-07-23 VITALS — DIASTOLIC BLOOD PRESSURE: 61 MMHG | SYSTOLIC BLOOD PRESSURE: 110 MMHG

## 2025-07-23 VITALS — SYSTOLIC BLOOD PRESSURE: 155 MMHG | DIASTOLIC BLOOD PRESSURE: 84 MMHG

## 2025-07-23 VITALS — SYSTOLIC BLOOD PRESSURE: 133 MMHG | DIASTOLIC BLOOD PRESSURE: 74 MMHG

## 2025-07-23 VITALS — SYSTOLIC BLOOD PRESSURE: 136 MMHG | DIASTOLIC BLOOD PRESSURE: 65 MMHG

## 2025-07-23 VITALS — DIASTOLIC BLOOD PRESSURE: 77 MMHG | SYSTOLIC BLOOD PRESSURE: 140 MMHG

## 2025-07-23 VITALS — SYSTOLIC BLOOD PRESSURE: 118 MMHG | DIASTOLIC BLOOD PRESSURE: 56 MMHG

## 2025-07-23 VITALS — SYSTOLIC BLOOD PRESSURE: 111 MMHG | DIASTOLIC BLOOD PRESSURE: 61 MMHG

## 2025-07-23 VITALS — DIASTOLIC BLOOD PRESSURE: 76 MMHG | SYSTOLIC BLOOD PRESSURE: 134 MMHG

## 2025-07-23 VITALS — SYSTOLIC BLOOD PRESSURE: 138 MMHG | DIASTOLIC BLOOD PRESSURE: 63 MMHG

## 2025-07-23 VITALS — SYSTOLIC BLOOD PRESSURE: 140 MMHG | DIASTOLIC BLOOD PRESSURE: 78 MMHG

## 2025-07-23 VITALS — DIASTOLIC BLOOD PRESSURE: 77 MMHG | SYSTOLIC BLOOD PRESSURE: 144 MMHG

## 2025-07-23 VITALS — DIASTOLIC BLOOD PRESSURE: 63 MMHG | SYSTOLIC BLOOD PRESSURE: 126 MMHG

## 2025-07-23 VITALS — WEIGHT: 248.9 LBS | HEIGHT: 68 IN | BODY MASS INDEX: 37.72 KG/M2

## 2025-07-23 VITALS — DIASTOLIC BLOOD PRESSURE: 54 MMHG | SYSTOLIC BLOOD PRESSURE: 98 MMHG

## 2025-07-23 VITALS — SYSTOLIC BLOOD PRESSURE: 115 MMHG | DIASTOLIC BLOOD PRESSURE: 61 MMHG

## 2025-07-23 VITALS — DIASTOLIC BLOOD PRESSURE: 69 MMHG | SYSTOLIC BLOOD PRESSURE: 129 MMHG

## 2025-07-23 VITALS — SYSTOLIC BLOOD PRESSURE: 133 MMHG | DIASTOLIC BLOOD PRESSURE: 61 MMHG

## 2025-07-23 VITALS — SYSTOLIC BLOOD PRESSURE: 113 MMHG | DIASTOLIC BLOOD PRESSURE: 62 MMHG

## 2025-07-23 VITALS — DIASTOLIC BLOOD PRESSURE: 70 MMHG | SYSTOLIC BLOOD PRESSURE: 143 MMHG

## 2025-07-23 VITALS — DIASTOLIC BLOOD PRESSURE: 65 MMHG | SYSTOLIC BLOOD PRESSURE: 138 MMHG

## 2025-07-23 VITALS — DIASTOLIC BLOOD PRESSURE: 70 MMHG | SYSTOLIC BLOOD PRESSURE: 128 MMHG

## 2025-07-23 VITALS — DIASTOLIC BLOOD PRESSURE: 61 MMHG | SYSTOLIC BLOOD PRESSURE: 128 MMHG

## 2025-07-23 VITALS — SYSTOLIC BLOOD PRESSURE: 138 MMHG | DIASTOLIC BLOOD PRESSURE: 66 MMHG

## 2025-07-23 VITALS — SYSTOLIC BLOOD PRESSURE: 125 MMHG | DIASTOLIC BLOOD PRESSURE: 60 MMHG

## 2025-07-23 DIAGNOSIS — Z79.899: ICD-10-CM

## 2025-07-23 DIAGNOSIS — Z3A.37: ICD-10-CM

## 2025-07-23 DIAGNOSIS — Z79.82: ICD-10-CM

## 2025-07-23 LAB
ALT SERPL W P-5'-P-CCNC: 34 U/L (ref 7–40)
AST SERPL-CCNC: 27 U/L (ref ?–34)
BILIRUB SERPL-MCNC: 0.4 MG/DL (ref 0.3–1.2)
CREAT SERPL-MCNC: 0.51 MG/DL (ref 0.55–1.3)
GFR SERPL CREATININE-BSD FRML MDRD: > 90 ML/MIN/{1.73_M2} (ref 60–?)
HCT VFR BLD AUTO: 36 % (ref 36–47)
HCV AB SER QL: < 0.02 INDEX (ref ?–0.8)
HGB BLD-MCNC: 12.2 G/DL (ref 12–15.5)
HIV 1+2 AB+HIV1 P24 AG SERPL QL IA: NEGATIVE
LDH SERPL L TO P-CCNC: 183 U/L (ref 120–246)
MCH RBC QN AUTO: 30 PG (ref 27–33)
MCHC RBC AUTO-ENTMCNC: 33.9 G/DL (ref 32–36.5)
MCV RBC AUTO: 88.7 FL (ref 80–96)
PLATELET # BLD AUTO: 168 10^3/UL (ref 150–450)
RBC # BLD AUTO: 4.06 10^6/UL (ref 4–5.4)
URATE SERPL-MCNC: 5.3 MG/DL (ref 3.1–7.8)
WBC # BLD AUTO: 6.1 10^3/UL (ref 4–10)

## 2025-07-23 PROCEDURE — 3E0P7GC INTRODUCTION OF OTHER THERAPEUTIC SUBSTANCE INTO FEMALE REPRODUCTIVE, VIA NATURAL OR ARTIFICIAL OPENING: ICD-10-PCS | Performed by: ADVANCED PRACTICE MIDWIFE

## 2025-07-23 PROCEDURE — 10907ZC DRAINAGE OF AMNIOTIC FLUID, THERAPEUTIC FROM PRODUCTS OF CONCEPTION, VIA NATURAL OR ARTIFICIAL OPENING: ICD-10-PCS | Performed by: ADVANCED PRACTICE MIDWIFE

## 2025-07-23 RX ADMIN — Medication SCH MLS/HR: at 18:08

## 2025-07-23 RX ADMIN — Medication SCH MLS/HR: at 21:50

## 2025-07-23 RX ADMIN — SODIUM CHLORIDE, POTASSIUM CHLORIDE, SODIUM LACTATE AND CALCIUM CHLORIDE SCH MLS/HR: 600; 310; 30; 20 INJECTION, SOLUTION INTRAVENOUS at 18:08

## 2025-07-24 VITALS — SYSTOLIC BLOOD PRESSURE: 122 MMHG | DIASTOLIC BLOOD PRESSURE: 59 MMHG

## 2025-07-24 VITALS — DIASTOLIC BLOOD PRESSURE: 72 MMHG | SYSTOLIC BLOOD PRESSURE: 142 MMHG

## 2025-07-24 VITALS — SYSTOLIC BLOOD PRESSURE: 129 MMHG | OXYGEN SATURATION: 98 % | DIASTOLIC BLOOD PRESSURE: 61 MMHG

## 2025-07-24 VITALS — SYSTOLIC BLOOD PRESSURE: 126 MMHG | DIASTOLIC BLOOD PRESSURE: 60 MMHG

## 2025-07-24 VITALS — DIASTOLIC BLOOD PRESSURE: 59 MMHG | SYSTOLIC BLOOD PRESSURE: 119 MMHG

## 2025-07-24 VITALS — SYSTOLIC BLOOD PRESSURE: 122 MMHG | DIASTOLIC BLOOD PRESSURE: 56 MMHG

## 2025-07-24 VITALS — DIASTOLIC BLOOD PRESSURE: 56 MMHG | SYSTOLIC BLOOD PRESSURE: 117 MMHG

## 2025-07-24 VITALS — DIASTOLIC BLOOD PRESSURE: 62 MMHG | SYSTOLIC BLOOD PRESSURE: 127 MMHG

## 2025-07-24 VITALS — SYSTOLIC BLOOD PRESSURE: 117 MMHG | DIASTOLIC BLOOD PRESSURE: 57 MMHG

## 2025-07-24 VITALS — SYSTOLIC BLOOD PRESSURE: 112 MMHG | DIASTOLIC BLOOD PRESSURE: 57 MMHG

## 2025-07-24 VITALS — DIASTOLIC BLOOD PRESSURE: 60 MMHG | SYSTOLIC BLOOD PRESSURE: 125 MMHG

## 2025-07-24 VITALS — DIASTOLIC BLOOD PRESSURE: 64 MMHG | SYSTOLIC BLOOD PRESSURE: 125 MMHG

## 2025-07-24 VITALS — DIASTOLIC BLOOD PRESSURE: 68 MMHG | SYSTOLIC BLOOD PRESSURE: 124 MMHG | OXYGEN SATURATION: 97 %

## 2025-07-24 VITALS — DIASTOLIC BLOOD PRESSURE: 59 MMHG | SYSTOLIC BLOOD PRESSURE: 123 MMHG

## 2025-07-24 VITALS — DIASTOLIC BLOOD PRESSURE: 55 MMHG | SYSTOLIC BLOOD PRESSURE: 123 MMHG

## 2025-07-24 VITALS — DIASTOLIC BLOOD PRESSURE: 54 MMHG | SYSTOLIC BLOOD PRESSURE: 123 MMHG

## 2025-07-24 VITALS — SYSTOLIC BLOOD PRESSURE: 125 MMHG | DIASTOLIC BLOOD PRESSURE: 60 MMHG

## 2025-07-24 VITALS — DIASTOLIC BLOOD PRESSURE: 61 MMHG | SYSTOLIC BLOOD PRESSURE: 133 MMHG

## 2025-07-24 VITALS — DIASTOLIC BLOOD PRESSURE: 73 MMHG | SYSTOLIC BLOOD PRESSURE: 128 MMHG

## 2025-07-24 VITALS — DIASTOLIC BLOOD PRESSURE: 67 MMHG | SYSTOLIC BLOOD PRESSURE: 143 MMHG

## 2025-07-24 VITALS — DIASTOLIC BLOOD PRESSURE: 63 MMHG | SYSTOLIC BLOOD PRESSURE: 129 MMHG

## 2025-07-24 VITALS — DIASTOLIC BLOOD PRESSURE: 51 MMHG | SYSTOLIC BLOOD PRESSURE: 105 MMHG

## 2025-07-24 VITALS — SYSTOLIC BLOOD PRESSURE: 136 MMHG | DIASTOLIC BLOOD PRESSURE: 63 MMHG

## 2025-07-24 VITALS — SYSTOLIC BLOOD PRESSURE: 108 MMHG | DIASTOLIC BLOOD PRESSURE: 56 MMHG

## 2025-07-24 VITALS — SYSTOLIC BLOOD PRESSURE: 140 MMHG | DIASTOLIC BLOOD PRESSURE: 76 MMHG

## 2025-07-24 VITALS — DIASTOLIC BLOOD PRESSURE: 62 MMHG | SYSTOLIC BLOOD PRESSURE: 130 MMHG

## 2025-07-24 VITALS — SYSTOLIC BLOOD PRESSURE: 121 MMHG | DIASTOLIC BLOOD PRESSURE: 57 MMHG

## 2025-07-24 RX ADMIN — Medication SCH TAB: at 09:48

## 2025-07-24 RX ADMIN — TRANEXAMIC ACID PRN MLS/HR: 100 INJECTION INTRAVENOUS at 06:10

## 2025-07-24 RX ADMIN — Medication PRN MLS/HR: at 06:10

## 2025-07-24 RX ADMIN — ACETAMINOPHEN PRN MG: 500 TABLET ORAL at 17:12

## 2025-07-24 RX ADMIN — LABETALOL HCL SCH MG: 200 TABLET, FILM COATED ORAL at 09:48

## 2025-07-25 VITALS — DIASTOLIC BLOOD PRESSURE: 67 MMHG | OXYGEN SATURATION: 98 % | SYSTOLIC BLOOD PRESSURE: 124 MMHG

## 2025-07-25 RX ADMIN — MEASLES, MUMPS, AND RUBELLA VIRUS VACCINE LIVE ONE ML: 1000; 12500; 1000 INJECTION, POWDER, LYOPHILIZED, FOR SUSPENSION SUBCUTANEOUS at 09:21

## 2025-07-25 RX ADMIN — IBUPROFEN PRN MG: 600 TABLET, FILM COATED ORAL at 08:13

## 2025-07-25 RX ADMIN — HUMAN RHO(D) IMMUNE GLOBULIN SCH MCG: 300 INJECTION, SOLUTION INTRAMUSCULAR at 07:20
